# Patient Record
Sex: FEMALE | Race: WHITE | NOT HISPANIC OR LATINO | Employment: OTHER | URBAN - METROPOLITAN AREA
[De-identification: names, ages, dates, MRNs, and addresses within clinical notes are randomized per-mention and may not be internally consistent; named-entity substitution may affect disease eponyms.]

---

## 2022-08-31 ENCOUNTER — OFFICE VISIT (OUTPATIENT)
Dept: HEMATOLOGY/ONCOLOGY | Facility: CLINIC | Age: 87
End: 2022-08-31
Payer: MEDICARE

## 2022-08-31 VITALS
HEIGHT: 62 IN | RESPIRATION RATE: 16 BRPM | BODY MASS INDEX: 21.9 KG/M2 | SYSTOLIC BLOOD PRESSURE: 153 MMHG | WEIGHT: 119 LBS | OXYGEN SATURATION: 97 % | DIASTOLIC BLOOD PRESSURE: 78 MMHG | HEART RATE: 64 BPM

## 2022-08-31 DIAGNOSIS — D61.818 PANCYTOPENIA: ICD-10-CM

## 2022-08-31 DIAGNOSIS — D64.9 ANEMIA, UNSPECIFIED TYPE: Primary | ICD-10-CM

## 2022-08-31 PROCEDURE — 99205 OFFICE O/P NEW HI 60 MIN: CPT | Mod: S$GLB,,, | Performed by: INTERNAL MEDICINE

## 2022-08-31 PROCEDURE — 99205 PR OFFICE/OUTPT VISIT, NEW, LEVL V, 60-74 MIN: ICD-10-PCS | Mod: S$GLB,,, | Performed by: INTERNAL MEDICINE

## 2022-08-31 RX ORDER — ISOSORBIDE MONONITRATE 30 MG/1
TABLET, EXTENDED RELEASE ORAL
COMMUNITY
Start: 2022-07-14

## 2022-08-31 RX ORDER — AMOXICILLIN AND CLAVULANATE POTASSIUM 875; 125 MG/1; MG/1
TABLET, FILM COATED ORAL
COMMUNITY
Start: 2022-06-21

## 2022-08-31 RX ORDER — METOPROLOL SUCCINATE 50 MG/1
TABLET, EXTENDED RELEASE ORAL
COMMUNITY
Start: 2022-07-14

## 2022-08-31 RX ORDER — PRAVASTATIN SODIUM 40 MG/1
TABLET ORAL
COMMUNITY
Start: 2022-07-14

## 2022-08-31 RX ORDER — FLUCONAZOLE 150 MG/1
TABLET ORAL
COMMUNITY
Start: 2022-05-20 | End: 2022-10-12 | Stop reason: DRUGHIGH

## 2022-08-31 RX ORDER — LEVOTHYROXINE SODIUM 75 UG/1
TABLET ORAL
COMMUNITY
Start: 2022-07-14

## 2022-08-31 RX ORDER — ALBUTEROL SULFATE 90 UG/1
AEROSOL, METERED RESPIRATORY (INHALATION)
COMMUNITY
Start: 2022-07-14

## 2022-08-31 RX ORDER — IPRATROPIUM BROMIDE 42 UG/1
SPRAY, METERED NASAL
COMMUNITY
Start: 2022-08-18

## 2022-08-31 NOTE — PROGRESS NOTES
HEMATOLOGY INITIAL CONSULTATION NOTE    Patient ID: Ai Pena is a 89 y.o. female.    Chief Complaint:  Pancytopenia    HPI:  Patient is a 89-year-old female with past medical history of hypertension, CAD, COPD, acute on chronic kidney disease who was referred by Nephrology for evaluation of pancytopenia noted recently on her labs.  The patient reports she has not had any colonoscopy in the past and is not interested in pursuing that at this time.  Due to her complaints of fatigue she was evaluated by Nephrology and underwent lab work which showed normal iron saturation B12 and folate.  She was also offered to be started on Procrit at the recent visit.  Presents to our clinic today for further evaluation                Past Medical History:   Diagnosis Date    Anemia, unspecified     COPD (chronic obstructive pulmonary disease)     Disorder of kidney and ureter     Hypertension     Irregular heart beat     Thyroid disease        Family History   Problem Relation Age of Onset    Heart disease Mother        Social History     Socioeconomic History    Marital status: Unknown   Tobacco Use    Smoking status: Never    Smokeless tobacco: Never   Substance and Sexual Activity    Alcohol use: Never    Drug use: Never         No past surgical history on file.        Review of systems:  Review of Systems   Constitutional:  Positive for activity change and fatigue. Negative for appetite change, chills, diaphoresis and unexpected weight change.   HENT:  Negative for congestion, facial swelling, hearing loss, mouth sores, trouble swallowing and voice change.    Eyes:  Negative for photophobia, pain, discharge and itching.   Respiratory:  Negative for apnea, cough, choking, chest tightness and shortness of breath.    Cardiovascular:  Negative for chest pain, palpitations and leg swelling.   Gastrointestinal:  Negative for abdominal distention, abdominal pain, anal bleeding and blood in stool.   Endocrine: Negative for cold  intolerance, heat intolerance, polydipsia and polyphagia.   Genitourinary:  Negative for difficulty urinating, dysuria, flank pain and hematuria.   Musculoskeletal:  Negative for arthralgias, back pain, joint swelling, myalgias, neck pain and neck stiffness.   Skin:  Negative for color change, pallor and wound.   Allergic/Immunologic: Negative for environmental allergies, food allergies and immunocompromised state.   Neurological:  Negative for dizziness, seizures, facial asymmetry, speech difficulty, light-headedness, numbness and headaches.   Hematological:  Negative for adenopathy. Does not bruise/bleed easily.   Psychiatric/Behavioral:  Negative for agitation, behavioral problems, confusion, decreased concentration and sleep disturbance.                                      Physical Exam  Vitals and nursing note reviewed.   Constitutional:       General: She is not in acute distress.     Appearance: Normal appearance. She is not ill-appearing.   HENT:      Head: Normocephalic and atraumatic.      Nose: No congestion or rhinorrhea.   Eyes:      General: No scleral icterus.     Extraocular Movements: Extraocular movements intact.      Pupils: Pupils are equal, round, and reactive to light.   Cardiovascular:      Rate and Rhythm: Normal rate and regular rhythm.      Pulses: Normal pulses.      Heart sounds: Normal heart sounds. No murmur heard.    No gallop.   Pulmonary:      Effort: Pulmonary effort is normal. No respiratory distress.      Breath sounds: Normal breath sounds. No stridor. No wheezing or rhonchi.   Abdominal:      General: Bowel sounds are normal. There is no distension.      Palpations: There is no mass.      Tenderness: There is no abdominal tenderness. There is no guarding.   Musculoskeletal:         General: No swelling, tenderness, deformity or signs of injury. Normal range of motion.      Cervical back: Normal range of motion and neck supple. No rigidity. No muscular tenderness.      Right  lower leg: No edema.      Left lower leg: No edema.   Skin:     General: Skin is warm.      Coloration: Skin is not jaundiced or pale.      Findings: No bruising or lesion.   Neurological:      General: No focal deficit present.      Mental Status: She is alert and oriented to person, place, and time.      Cranial Nerves: No cranial nerve deficit.      Sensory: No sensory deficit.      Motor: Weakness present.      Gait: Gait normal.      Comments: +ve for Generalized weakness   Psychiatric:         Mood and Affect: Mood normal.         Behavior: Behavior normal.         Thought Content: Thought content normal.     Vitals:    08/31/22 0859   BP: (!) 153/78   Pulse: 64   Resp: 16   Body surface area is 1.54 meters squared.    Assessment/Plan:       Pancytopenia:    == Macrocytic anemia with hemoglobin 7.8, hematocrit 24.7 and .  Neutropenic with ANC less than 1000.  Prior peripheral smear done with her nephrologist revealed anisocytosis, polychromasia with macrocytosis along with target cells.  She was also noted to have elevated lymphocytes along with mild basophilia and banded neutrophils.  == Discussed with her and her family about possible etiologies of pancytopenia in elderly.  Discussed with them about possibility of myelodysplasia syndrome and in this regard would will obtain peripheral smear and flow cytometry for completion.  I do not have B12, folate, TSH levels but have documentation from the nephrologist that these were normal.   == Patient and family at this time have declined colonoscopy and a bone marrow biopsy.  However I discussed with her that if any abnormalities noted on peripheral smear or flow we can discuss this again.  While evaluating her for possible MDS would recommend PRBC blood transfusion for hemoglobin less than 7 or symptomatic.  She reports being on Procrit injections by her nephrologist and this seemed to be helping little bit.  I discussed with her that this is something she  can continue at this time until we have a diagnosis.      Plan:   CBC, peripheral smear, flow cytometry  Iron profile  Type and cross and transfuse 1 unit PRBC for symptomatic anemia      Return to clinic in 2 weeks for MD visit with CBC with diff prior      Pt is instructed to RTC with labs for continued monitoring of treatment as instructed.     Total time spent in counseling and discussion about further management options including relevant lab work, treatment,  prognosis, medications and intended side effects was more than 60 minutes. More than 50 % of the time was spent in counseling and coordination of care.  I spent a total of 60 minutes on the day of the visit.This includes face to face time and non-face to face time preparing to see the patient (eg, review of tests), Obtaining and/or reviewing separately obtained history, Documenting clinical information in the electronic or other health record, Independently interpreting resultsand communicating results to the patient/family/caregiver, or Care coordination.

## 2022-09-01 PROBLEM — D61.818 PANCYTOPENIA: Status: ACTIVE | Noted: 2022-09-01

## 2022-09-09 DIAGNOSIS — D64.9 ANEMIA, UNSPECIFIED TYPE: Primary | ICD-10-CM

## 2022-09-14 ENCOUNTER — OFFICE VISIT (OUTPATIENT)
Dept: HEMATOLOGY/ONCOLOGY | Facility: CLINIC | Age: 87
End: 2022-09-14
Payer: MEDICARE

## 2022-09-14 VITALS
RESPIRATION RATE: 18 BRPM | DIASTOLIC BLOOD PRESSURE: 56 MMHG | SYSTOLIC BLOOD PRESSURE: 167 MMHG | HEIGHT: 62 IN | HEART RATE: 77 BPM | BODY MASS INDEX: 21.77 KG/M2 | OXYGEN SATURATION: 98 %

## 2022-09-14 DIAGNOSIS — D61.818 PANCYTOPENIA: Primary | ICD-10-CM

## 2022-09-14 LAB
ANISOCYTOSIS: ABNORMAL
BANDS: 1 % (ref 0–5)
CELLS COUNTED: 100
EOSINOPHIL NFR BLD: 1 % (ref 1–3)
ERYTHROCYTE [DISTWIDTH] IN BLOOD BY AUTOMATED COUNT: 17.8 % (ref 12.5–18)
HCT VFR BLD AUTO: 28.7 % (ref 37–47)
HGB BLD-MCNC: 9.2 G/DL (ref 12–16)
LYMPHOCYTES NFR BLD: 63 % (ref 25–40)
MCH RBC QN AUTO: 32.1 PG (ref 27–31.2)
MCHC RBC AUTO-ENTMCNC: 32.1 G/DL (ref 31.8–35.4)
MCV RBC AUTO: 100 FL (ref 80–97)
METAMYELOCYTES # BLD MANUAL: 2 % (ref 0–0)
MONOCYTES NFR BLD: 11 % (ref 1–15)
NEUTROPHILS # BLD AUTO: 0.5 10*3/UL (ref 1.8–7.7)
NEUTROPHILS NFR BLD: 22 % (ref 37–80)
NUCLEATED RED BLOOD CELLS: 7.8 %
PLATELETS: 46 10*3/UL (ref 142–424)
RBC # BLD AUTO: 2.87 10*6/UL (ref 4.2–5.4)
SMALL PLATELETS BLD QL SMEAR: ABNORMAL
WBC # BLD: 2.3 10*3/UL (ref 4.6–10.2)

## 2022-09-14 PROCEDURE — 99215 PR OFFICE/OUTPT VISIT, EST, LEVL V, 40-54 MIN: ICD-10-PCS | Mod: S$GLB,,, | Performed by: INTERNAL MEDICINE

## 2022-09-14 PROCEDURE — 99215 OFFICE O/P EST HI 40 MIN: CPT | Mod: S$GLB,,, | Performed by: INTERNAL MEDICINE

## 2022-09-14 NOTE — PROGRESS NOTES
HEMATOLOGY FOLLOW UP CONSULTATION NOTE    Patient ID: Ai Pena is a 89 y.o. female.    Chief Complaint:  Pancytopenia    HPI:  Patient is a 89-year-old female with past medical history of hypertension, CAD, COPD, acute on chronic kidney disease who was referred by Nephrology for evaluation of pancytopenia noted recently on her labs.  The patient reports she has not had any colonoscopy in the past and is not interested in pursuing that at this time.  Due to her complaints of fatigue she was evaluated by Nephrology and underwent lab work which showed normal iron saturation B12 and folate.  She was also offered to be started on Procrit at the recent visit.  Presents to our clinic today for further evaluation      PERIPHERAL BLOOD, FLOW CYTOMETRY: 9/2/22     - 11% CD34+/+ CELLS IDENTIFIED, COMPATIBLE WITH MYELOBLASTS, SEE   COMMENT.   Comment: Flow cytometric analysis identifies an immunophenotypically   distinct population,  compatible with myeloblasts (see complete   immunophenotyping below).  These cells may represent either a reactive   process in response to endogenous or exogenous cytokines, or a neoplastic   process, such as evolution of either a myeloproliferative or myelodysplastic   process.          Past Medical History:   Diagnosis Date    Anemia, unspecified     COPD (chronic obstructive pulmonary disease)     Disorder of kidney and ureter     Hypertension     Irregular heart beat     Thyroid disease        Family History   Problem Relation Age of Onset    Heart disease Mother        Social History     Socioeconomic History    Marital status: Unknown   Tobacco Use    Smoking status: Never    Smokeless tobacco: Never   Substance and Sexual Activity    Alcohol use: Never    Drug use: Never         No past surgical history on file.        Review of systems:  Review of Systems   Constitutional:  Positive for activity change and fatigue. Negative for appetite change, chills, diaphoresis and unexpected  weight change.   HENT:  Negative for congestion, facial swelling, hearing loss, mouth sores, trouble swallowing and voice change.    Eyes:  Negative for photophobia, pain, discharge and itching.   Respiratory:  Negative for apnea, cough, choking, chest tightness and shortness of breath.    Cardiovascular:  Negative for chest pain, palpitations and leg swelling.   Gastrointestinal:  Negative for abdominal distention, abdominal pain, anal bleeding and blood in stool.   Endocrine: Negative for cold intolerance, heat intolerance, polydipsia and polyphagia.   Genitourinary:  Negative for difficulty urinating, dysuria, flank pain and hematuria.   Musculoskeletal:  Negative for arthralgias, back pain, joint swelling, myalgias, neck pain and neck stiffness.   Skin:  Negative for color change, pallor and wound.   Allergic/Immunologic: Negative for environmental allergies, food allergies and immunocompromised state.   Neurological:  Negative for dizziness, seizures, facial asymmetry, speech difficulty, light-headedness, numbness and headaches.   Hematological:  Negative for adenopathy. Does not bruise/bleed easily.   Psychiatric/Behavioral:  Negative for agitation, behavioral problems, confusion, decreased concentration and sleep disturbance.                                      Physical Exam  Vitals and nursing note reviewed.   Constitutional:       General: She is not in acute distress.     Appearance: Normal appearance. She is not ill-appearing.   HENT:      Head: Normocephalic and atraumatic.      Nose: No congestion or rhinorrhea.   Eyes:      General: No scleral icterus.     Extraocular Movements: Extraocular movements intact.      Pupils: Pupils are equal, round, and reactive to light.   Cardiovascular:      Rate and Rhythm: Normal rate and regular rhythm.      Pulses: Normal pulses.      Heart sounds: Normal heart sounds. No murmur heard.    No gallop.   Pulmonary:      Effort: Pulmonary effort is normal. No  respiratory distress.      Breath sounds: Normal breath sounds. No stridor. No wheezing or rhonchi.   Abdominal:      General: Bowel sounds are normal. There is no distension.      Palpations: There is no mass.      Tenderness: There is no abdominal tenderness. There is no guarding.   Musculoskeletal:         General: No swelling, tenderness, deformity or signs of injury. Normal range of motion.      Cervical back: Normal range of motion and neck supple. No rigidity. No muscular tenderness.      Right lower leg: No edema.      Left lower leg: No edema.   Skin:     General: Skin is warm.      Coloration: Skin is not jaundiced or pale.      Findings: No bruising or lesion.   Neurological:      General: No focal deficit present.      Mental Status: She is alert and oriented to person, place, and time.      Cranial Nerves: No cranial nerve deficit.      Sensory: No sensory deficit.      Motor: Weakness present.      Gait: Gait normal.      Comments: +ve for Generalized weakness   Psychiatric:         Mood and Affect: Mood normal.         Behavior: Behavior normal.         Thought Content: Thought content normal.     There were no vitals filed for this visit.  There is no height or weight on file to calculate BSA.    Assessment/Plan:       Pancytopenia:    == Macrocytic anemia with hemoglobin 7.8, hematocrit 24.7 and .  Neutropenic with ANC less than 1000.  Prior peripheral smear done with her nephrologist revealed anisocytosis, polychromasia with macrocytosis along with target cells.  She was also noted to have elevated lymphocytes along with mild basophilia and banded neutrophils.  == Discussed with her and her family about possible etiologies of pancytopenia in elderly.  Discussed with them about possibility of myelodysplasia syndrome and in this regard would will obtain peripheral smear and flow cytometry for completion.  I do not have B12, folate, TSH levels but have documentation from the nephrologist that  these were normal.   == Patient and family at this time have declined colonoscopy and a bone marrow biopsy.  However I discussed with her that if any abnormalities noted on peripheral smear or flow we can discuss this again.  While evaluating her for possible MDS would recommend PRBC blood transfusion for hemoglobin less than 7 or symptomatic.  She reports being on Procrit injections by her nephrologist and this seemed to be helping little bit.  I discussed with her that this is something she can continue at this time until we have a diagnosis.  == 9/14/22:  Peripheral blood flow cytometry revealed 11% CD34+/+ cells compatible with myeloblasts.  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  NextGen sequencing was requested and is pending at this time.  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  Bone marrow biopsy is the next step but patient and family reluctant at this time to do any invasive procedure, understandably secondary to patient's age.  Patient and family reports that 2 months back patient was completely independent and even now has good functional status but gets tired more easily.  Either ways, even if bone marrow biopsy shows more than 20% blast cells and diagnosis of acute leukemia, due to advanced age patient is not a candidate for intensive induction treatment.  If I have more information on NextGen sequencing or if patient agrees to get a bone marrow biopsy and I have an idea about cytogenetics we can discuss other less aggressive treatment options.  In the interim would recommend PRBC transfusion for hemoglobin less than 7      Plan:   Awaiting results of NGS testing  Type and cross and transfuse 1 unit PRBC for symptomatic anemia or hemoglobin less than 7      Return to clinic in 2 weeks for MD visit with CBC with diff prior      Pt is instructed to RTC with labs  for continued monitoring of treatment as instructed.     Total time spent in counseling and discussion about further management options including relevant lab work, treatment,  prognosis, medications and intended side effects was more than 60 minutes. More than 50 % of the time was spent in counseling and coordination of care.  I spent a total of 60 minutes on the day of the visit.This includes face to face time and non-face to face time preparing to see the patient (eg, review of tests), Obtaining and/or reviewing separately obtained history, Documenting clinical information in the electronic or other health record, Independently interpreting resultsand communicating results to the patient/family/caregiver, or Care coordination.

## 2022-09-16 ENCOUNTER — TELEPHONE (OUTPATIENT)
Dept: HEMATOLOGY/ONCOLOGY | Facility: CLINIC | Age: 87
End: 2022-09-16
Payer: MEDICARE

## 2022-09-16 LAB — SPECIMEN TO PATHOLOGY: NORMAL

## 2022-09-20 DIAGNOSIS — D61.818 PANCYTOPENIA: Primary | ICD-10-CM

## 2022-09-21 ENCOUNTER — OFFICE VISIT (OUTPATIENT)
Dept: HEMATOLOGY/ONCOLOGY | Facility: CLINIC | Age: 87
End: 2022-09-21
Payer: MEDICARE

## 2022-09-21 VITALS
OXYGEN SATURATION: 99 % | DIASTOLIC BLOOD PRESSURE: 59 MMHG | BODY MASS INDEX: 22.26 KG/M2 | WEIGHT: 121 LBS | HEART RATE: 83 BPM | RESPIRATION RATE: 15 BRPM | SYSTOLIC BLOOD PRESSURE: 164 MMHG | HEIGHT: 62 IN

## 2022-09-21 DIAGNOSIS — D46.9 MDS (MYELODYSPLASTIC SYNDROME): ICD-10-CM

## 2022-09-21 DIAGNOSIS — D61.818 PANCYTOPENIA: Primary | ICD-10-CM

## 2022-09-21 LAB
ANISOCYTOSIS: ABNORMAL
ATYPICAL LYMPHOCYTES: 1 % (ref 0–0)
BANDS: 1 % (ref 0–5)
CELLS COUNTED: 100
EOSINOPHIL NFR BLD: 4 % (ref 1–3)
ERYTHROCYTE [DISTWIDTH] IN BLOOD BY AUTOMATED COUNT: 18.2 % (ref 12.5–18)
HCT VFR BLD AUTO: 26.1 % (ref 37–47)
HGB BLD-MCNC: 8.2 G/DL (ref 12–16)
LYMPHOCYTES NFR BLD: 64 % (ref 25–40)
MACROCYTES BLD QL SMEAR: ABNORMAL
MCH RBC QN AUTO: 31.7 PG (ref 27–31.2)
MCHC RBC AUTO-ENTMCNC: 31.4 G/DL (ref 31.8–35.4)
MCV RBC AUTO: 100.8 FL (ref 80–97)
METAMYELOCYTES # BLD MANUAL: 1 % (ref 0–0)
MONOCYTES NFR BLD: 11 % (ref 1–15)
NEUTROPHILS # BLD AUTO: 0.5 10*3/UL (ref 1.8–7.7)
NEUTROPHILS NFR BLD: 18 % (ref 37–80)
PLATELETS: 62 10*3/UL (ref 142–424)
RBC # BLD AUTO: 2.59 10*6/UL (ref 4.2–5.4)
SMALL PLATELETS BLD QL SMEAR: ABNORMAL
WBC # BLD: 2.5 10*3/UL (ref 4.6–10.2)

## 2022-09-21 PROCEDURE — 99215 OFFICE O/P EST HI 40 MIN: CPT | Mod: S$GLB,,, | Performed by: INTERNAL MEDICINE

## 2022-09-21 PROCEDURE — 99215 PR OFFICE/OUTPT VISIT, EST, LEVL V, 40-54 MIN: ICD-10-PCS | Mod: S$GLB,,, | Performed by: INTERNAL MEDICINE

## 2022-09-21 NOTE — PROGRESS NOTES
HEMATOLOGY FOLLOW UP CONSULTATION NOTE    Patient ID: Ai Pena is a 89 y.o. female.    Chief Complaint:  Pancytopenia    HPI:  Patient is a 89-year-old female with past medical history of hypertension, CAD, COPD, acute on chronic kidney disease who was referred by Nephrology for evaluation of pancytopenia noted recently on her labs.  The patient reports she has not had any colonoscopy in the past and is not interested in pursuing that at this time.  Due to her complaints of fatigue she was evaluated by Nephrology and underwent lab work which showed normal iron saturation B12 and folate.  She was also offered to be started on Procrit at the recent visit.  Presents to our clinic today for further evaluation      PERIPHERAL BLOOD, FLOW CYTOMETRY: 9/2/22     - 11% CD34+/+ CELLS IDENTIFIED, COMPATIBLE WITH MYELOBLASTS, SEE   COMMENT.   Comment: Flow cytometric analysis identifies an immunophenotypically   distinct population,  compatible with myeloblasts (see complete   immunophenotyping below).  These cells may represent either a reactive   process in response to endogenous or exogenous cytokines, or a neoplastic   process, such as evolution of either a myeloproliferative or myelodysplastic   process.          Past Medical History:   Diagnosis Date    Anemia, unspecified     COPD (chronic obstructive pulmonary disease)     Disorder of kidney and ureter     Hypertension     Irregular heart beat     Thyroid disease        Family History   Problem Relation Age of Onset    Heart disease Mother        Social History     Socioeconomic History    Marital status: Unknown   Tobacco Use    Smoking status: Never    Smokeless tobacco: Never   Substance and Sexual Activity    Alcohol use: Never    Drug use: Never         No past surgical history on file.        Review of systems:  Review of Systems   Constitutional:  Positive for activity change and fatigue. Negative for appetite change, chills, diaphoresis and unexpected  weight change.   HENT:  Negative for congestion, facial swelling, hearing loss, mouth sores, trouble swallowing and voice change.    Eyes:  Negative for photophobia, pain, discharge and itching.   Respiratory:  Negative for apnea, cough, choking, chest tightness and shortness of breath.    Cardiovascular:  Negative for chest pain, palpitations and leg swelling.   Gastrointestinal:  Negative for abdominal distention, abdominal pain, anal bleeding and blood in stool.   Endocrine: Negative for cold intolerance, heat intolerance, polydipsia and polyphagia.   Genitourinary:  Negative for difficulty urinating, dysuria, flank pain and hematuria.   Musculoskeletal:  Negative for arthralgias, back pain, joint swelling, myalgias, neck pain and neck stiffness.   Skin:  Negative for color change, pallor and wound.   Allergic/Immunologic: Negative for environmental allergies, food allergies and immunocompromised state.   Neurological:  Negative for dizziness, seizures, facial asymmetry, speech difficulty, light-headedness, numbness and headaches.   Hematological:  Negative for adenopathy. Does not bruise/bleed easily.   Psychiatric/Behavioral:  Negative for agitation, behavioral problems, confusion, decreased concentration and sleep disturbance.                                      Physical Exam  Vitals and nursing note reviewed.   Constitutional:       General: She is not in acute distress.     Appearance: Normal appearance. She is not ill-appearing.   HENT:      Head: Normocephalic and atraumatic.      Nose: No congestion or rhinorrhea.   Eyes:      General: No scleral icterus.     Extraocular Movements: Extraocular movements intact.      Pupils: Pupils are equal, round, and reactive to light.   Cardiovascular:      Rate and Rhythm: Normal rate and regular rhythm.      Pulses: Normal pulses.      Heart sounds: Normal heart sounds. No murmur heard.    No gallop.   Pulmonary:      Effort: Pulmonary effort is normal. No  respiratory distress.      Breath sounds: Normal breath sounds. No stridor. No wheezing or rhonchi.   Abdominal:      General: Bowel sounds are normal. There is no distension.      Palpations: There is no mass.      Tenderness: There is no abdominal tenderness. There is no guarding.   Musculoskeletal:         General: No swelling, tenderness, deformity or signs of injury. Normal range of motion.      Cervical back: Normal range of motion and neck supple. No rigidity. No muscular tenderness.      Right lower leg: No edema.      Left lower leg: No edema.   Skin:     General: Skin is warm.      Coloration: Skin is not jaundiced or pale.      Findings: No bruising or lesion.   Neurological:      General: No focal deficit present.      Mental Status: She is alert and oriented to person, place, and time.      Cranial Nerves: No cranial nerve deficit.      Sensory: No sensory deficit.      Motor: Weakness present.      Gait: Gait normal.      Comments: +ve for Generalized weakness   Psychiatric:         Mood and Affect: Mood normal.         Behavior: Behavior normal.         Thought Content: Thought content normal.     Vitals:    09/21/22 1124   BP: (!) 164/59   Pulse: 83   Resp: 15     Body surface area is 1.55 meters squared.    Assessment/Plan:       MDS: TP53, SRSF2, ATRX +ve:    == Macrocytic anemia with hemoglobin 7.8, hematocrit 24.7 and .  Neutropenic with ANC less than 1000.  Prior peripheral smear done with her nephrologist revealed anisocytosis, polychromasia with macrocytosis along with target cells.  She was also noted to have elevated lymphocytes along with mild basophilia and banded neutrophils.  == Discussed with her and her family about possible etiologies of pancytopenia in elderly.  Discussed with them about possibility of myelodysplasia syndrome and in this regard would will obtain peripheral smear and flow cytometry for completion.  I do not have B12, folate, TSH levels but have documentation  from the nephrologist that these were normal.   == Patient and family at this time have declined colonoscopy and a bone marrow biopsy.  However I discussed with her that if any abnormalities noted on peripheral smear or flow we can discuss this again.  While evaluating her for possible MDS would recommend PRBC blood transfusion for hemoglobin less than 7 or symptomatic.  She reports being on Procrit injections by her nephrologist and this seemed to be helping little bit.  I discussed with her that this is something she can continue at this time until we have a diagnosis.  == 9/14/22:  Peripheral blood flow cytometry revealed 11% CD34+/+ cells compatible with myeloblasts.  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  NextGen sequencing was requested and is pending at this time.  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  Bone marrow biopsy is the next step but patient and family reluctant at this time to do any invasive procedure, understandably secondary to patient's age.  Patient and family reports that 2 months back patient was completely independent and even now has good functional status but gets tired more easily.  Either ways, even if bone marrow biopsy shows more than 20% blast cells and diagnosis of acute leukemia, due to advanced age patient is not a candidate for intensive induction treatment.  If I have more information on NextGen sequencing or if patient agrees to get a bone marrow biopsy and I have an idea about cytogenetics we can discuss other less aggressive treatment options.  In the interim would recommend PRBC transfusion for hemoglobin less than 7  == 9/2/22:  Results from extended NGS on flow cytometry revealed no alterations in FLT3, IDH1, IDH2 and NPM1. Relevant positive mutations noted were  as below:  TP53 which is associated with a poor prognosis in  myelodysplasia syndrome and are more frequently seen with complex karyotype and deletion (5q).  TP53 mutations may predict resistance or relapse to lenalidomide.  SRSF2 mutations are common and MDS, CMML, AML and MPN.  Among cases of AML, SRSF2 mutations are much more common in secondary AML.  They are associated with an unfavorable prognosis in MDS and MPN.  The ATRX mutation is a low-level mutation that appears to be present in the sub population of cells.  ATRX gene mutation and myeloid neoplasms is uncommon        Plan:   Referral for bone marrow biopsy  Type and cross and transfuse 1 unit PRBC for symptomatic anemia or hemoglobin less than 7      Return to clinic in 2 weeks for MD visit with CBC with diff prior      Pt is instructed to RTC with labs for continued monitoring of treatment as instructed.     Total time spent in counseling and discussion about further management options including relevant lab work, treatment,  prognosis, medications and intended side effects was more than 60 minutes. More than 50 % of the time was spent in counseling and coordination of care.  I spent a total of 60 minutes on the day of the visit.This includes face to face time and non-face to face time preparing to see the patient (eg, review of tests), Obtaining and/or reviewing separately obtained history, Documenting clinical information in the electronic or other health record, Independently interpreting resultsand communicating results to the patient/family/caregiver, or Care coordination.

## 2022-09-26 DIAGNOSIS — D61.818 PANCYTOPENIA: Primary | ICD-10-CM

## 2022-09-26 LAB
ANISOCYTOSIS: ABNORMAL
CELLS COUNTED: 100
EOSINOPHIL NFR BLD: 1 % (ref 0–6)
ERYTHROCYTE [DISTWIDTH] IN BLOOD BY AUTOMATED COUNT: 17.9 % (ref 0–15.5)
FRAGMENTED CELLS: ABNORMAL
GRANULOCYTES: 0.2 10*3/UL (ref 1.4–7)
HCT VFR BLD AUTO: 22.6 % (ref 37–47)
HGB BLD-MCNC: 7 G/DL (ref 12–16)
HYPOCHROMIA BLD QL SMEAR: ABNORMAL
LYMPHOCYTES NFR BLD: 80 % (ref 20–45)
MACROCYTES BLD QL SMEAR: ABNORMAL
MCH RBC QN AUTO: 31 PG (ref 27–32)
MCHC RBC AUTO-ENTMCNC: 31 % (ref 32–36)
MCV RBC AUTO: 100 FL (ref 80–99)
METAMYELOCYTES # BLD MANUAL: 1 % (ref 0–1)
MONOCYTES NFR BLD: 4 % (ref 2–10)
MYELOCYTES: 4 % (ref 0–1)
NEUTROPHILS NFR BLD: 10 % (ref 50–80)
NUCLEATED RBC-MANUAL: 0 /100 WBC
PLATELET MORPHOLOGY: ABNORMAL
PLATELETS: 45 10*3/UL (ref 130–400)
POIKILOCYTOSIS: ABNORMAL
RBC # BLD AUTO: 2.26 10*6/UL (ref 4.2–5.4)
SMALL PLATELETS BLD QL SMEAR: ABNORMAL
WBC # BLD: 2.3 10*3/UL (ref 4.5–10)

## 2022-09-30 DIAGNOSIS — D61.818 PANCYTOPENIA: Primary | ICD-10-CM

## 2022-10-03 ENCOUNTER — OFFICE VISIT (OUTPATIENT)
Dept: HEMATOLOGY/ONCOLOGY | Facility: CLINIC | Age: 87
End: 2022-10-03
Payer: MEDICARE

## 2022-10-03 VITALS
RESPIRATION RATE: 16 BRPM | HEART RATE: 71 BPM | DIASTOLIC BLOOD PRESSURE: 72 MMHG | WEIGHT: 128 LBS | SYSTOLIC BLOOD PRESSURE: 151 MMHG | OXYGEN SATURATION: 96 % | BODY MASS INDEX: 23.55 KG/M2 | HEIGHT: 62 IN

## 2022-10-03 DIAGNOSIS — C92.00 ACUTE MYELOID LEUKEMIA NOT HAVING ACHIEVED REMISSION: Primary | ICD-10-CM

## 2022-10-03 LAB
ATYPICAL LYMPHOCYTES: 1 % (ref 0–0)
BANDS: 1 % (ref 0–5)
CELLS COUNTED: 100
ERYTHROCYTE [DISTWIDTH] IN BLOOD BY AUTOMATED COUNT: 17.4 % (ref 12.5–18)
HCT VFR BLD AUTO: 25.3 % (ref 37–47)
HGB BLD-MCNC: 8.3 G/DL (ref 12–16)
LYMPHOCYTES NFR BLD: 56 % (ref 25–40)
MCH RBC QN AUTO: 31.3 PG (ref 27–31.2)
MCHC RBC AUTO-ENTMCNC: 32.4 G/DL (ref 31.8–35.4)
MCV RBC AUTO: 96.6 FL (ref 80–97)
METAMYELOCYTES # BLD MANUAL: 5 % (ref 0–0)
MONOCYTES NFR BLD: 15 % (ref 1–15)
MYELOCYTES: 3 % (ref 0–0)
NEUTROPHILS # BLD AUTO: 0.4 10*3/UL (ref 1.8–7.7)
NEUTROPHILS NFR BLD: 19 % (ref 37–80)
NUCLEATED RED BLOOD CELLS: 4.1 %
PLATELETS: 65 10*3/UL (ref 142–424)
RBC # BLD AUTO: 2.62 10*6/UL (ref 4.2–5.4)
RBC MORPH BLD: ABNORMAL
SMALL PLATELETS BLD QL SMEAR: ABNORMAL
WBC # BLD: 2.2 10*3/UL (ref 4.6–10.2)

## 2022-10-03 PROCEDURE — 99215 OFFICE O/P EST HI 40 MIN: CPT | Mod: S$GLB,,, | Performed by: INTERNAL MEDICINE

## 2022-10-03 PROCEDURE — 99215 PR OFFICE/OUTPT VISIT, EST, LEVL V, 40-54 MIN: ICD-10-PCS | Mod: S$GLB,,, | Performed by: INTERNAL MEDICINE

## 2022-10-04 ENCOUNTER — SPECIALTY PHARMACY (OUTPATIENT)
Dept: PHARMACY | Facility: CLINIC | Age: 87
End: 2022-10-04
Payer: MEDICARE

## 2022-10-04 DIAGNOSIS — D61.818 PANCYTOPENIA: Primary | ICD-10-CM

## 2022-10-04 DIAGNOSIS — C92.00 ACUTE MYELOID LEUKEMIA NOT HAVING ACHIEVED REMISSION: Primary | ICD-10-CM

## 2022-10-04 RX ORDER — ONDANSETRON 8 MG/1
8 TABLET, ORALLY DISINTEGRATING ORAL 3 TIMES DAILY PRN
Qty: 90 TABLET | Refills: 6 | Status: SHIPPED | OUTPATIENT
Start: 2022-10-04 | End: 2022-11-03

## 2022-10-04 NOTE — PROGRESS NOTES
HEMATOLOGY FOLLOW UP CONSULTATION NOTE    Patient ID: Ai Pena is a 89 y.o. female.    Chief Complaint:  Pancytopenia    HPI:  Patient is a 89-year-old female with past medical history of hypertension, CAD, COPD, acute on chronic kidney disease who was referred by Nephrology for evaluation of pancytopenia noted recently on her labs.  The patient reports she has not had any colonoscopy in the past and is not interested in pursuing that at this time.  Due to her complaints of fatigue she was evaluated by Nephrology and underwent lab work which showed normal iron saturation B12 and folate.  She was also offered to be started on Procrit at the recent visit.  Presents to our clinic today for further evaluation      PERIPHERAL BLOOD, FLOW CYTOMETRY: 9/2/22     - 11% CD34+/+ CELLS IDENTIFIED, COMPATIBLE WITH MYELOBLASTS, SEE   COMMENT.   Comment: Flow cytometric analysis identifies an immunophenotypically   distinct population,  compatible with myeloblasts (see complete   immunophenotyping below).  These cells may represent either a reactive   process in response to endogenous or exogenous cytokines, or a neoplastic   process, such as evolution of either a myeloproliferative or myelodysplastic   process.    09/30/2022    PERIPHERAL SMEAR REVIEW, BONE MARROW ASPIRATION, BONE MARROW BIOPSY AND FLOW   CYTOMETRIC EVALUATION:      1.  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      2.  RESULTING PANCYTOPENIA.      3.  MACROCYTIC ANEMIA.      4.  PERIPHERAL ABSOLUTE NEUTROPENIA.      5.  THROMBOCYTOPENIA.      6.  SOME SCHISTOCYTES ARE PRESENT.      7.  IRON 4+ OF 6.      8.  MOLECULAR STUDIES ARE UNDERWAY AND WILL BE REPORTED AT A LATER TIME.      9.  SEE BELOW.   Comment:  The patient presents with a history of pancytopenia and the   patient has had a previous flow cytometry showing 11% myeloblasts   approximately one month ago, see [id number].   Currently the patient has 22%   myeloblasts in the bone marrow,  qualifying this as acute leukemia.   This   information is relayed to Dr. Olivas on 09/29/2022 who requested some   molecular studies be performed on this specimen.  That testing is underway   and will be reported at a later time.  Therefore this bone marrow has   progressed to acute myeloid leukemia with 22% myeloblasts present on flow   cytometry.  This could be occurring in a background of myelodysplasia   because of the significant atypia seen in the megakaryocytes.     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 Per professional practice guidelines, testing for IDH   mutations has diagnostic and prognostic implications in the workup of   gliomas and guides therapy selection in AML. AML patients with IDH mutations    may respond to venetoclax-based therapy or IDH inhibitors.                               Past Medical History:   Diagnosis Date    Anemia, unspecified     COPD (chronic obstructive pulmonary disease)     Disorder of kidney and ureter     Hypertension     Irregular heart beat     Thyroid disease        Family History   Problem Relation Age of Onset    Heart disease Mother        Social History     Socioeconomic History    Marital status: Unknown   Tobacco Use    Smoking status: Never    Smokeless tobacco: Never   Substance and Sexual Activity    Alcohol use: Never    Drug use: Never         No past surgical history on file.        Review of systems:  Review of Systems   Constitutional:  Positive for activity change and fatigue.  Negative for appetite change, chills, diaphoresis and unexpected weight change.   HENT:  Negative for congestion, facial swelling, hearing loss, mouth sores, trouble swallowing and voice change.    Eyes:  Negative for photophobia, pain, discharge and itching.   Respiratory:  Negative for apnea, cough, choking, chest tightness and shortness of breath.    Cardiovascular:  Negative for chest pain, palpitations and leg swelling.   Gastrointestinal:  Negative for abdominal distention, abdominal pain, anal bleeding and blood in stool.   Endocrine: Negative for cold intolerance, heat intolerance, polydipsia and polyphagia.   Genitourinary:  Negative for difficulty urinating, dysuria, flank pain and hematuria.   Musculoskeletal:  Negative for arthralgias, back pain, joint swelling, myalgias, neck pain and neck stiffness.   Skin:  Negative for color change, pallor and wound.   Allergic/Immunologic: Negative for environmental allergies, food allergies and immunocompromised state.   Neurological:  Negative for dizziness, seizures, facial asymmetry, speech difficulty, light-headedness, numbness and headaches.   Hematological:  Negative for adenopathy. Does not bruise/bleed easily.   Psychiatric/Behavioral:  Negative for agitation, behavioral problems, confusion, decreased concentration and sleep disturbance.                                      Physical Exam  Vitals and nursing note reviewed.   Constitutional:       General: She is not in acute distress.     Appearance: Normal appearance. She is not ill-appearing.   HENT:      Head: Normocephalic and atraumatic.      Nose: No congestion or rhinorrhea.   Eyes:      General: No scleral icterus.     Extraocular Movements: Extraocular movements intact.      Pupils: Pupils are equal, round, and reactive to light.   Cardiovascular:      Rate and Rhythm: Normal rate and regular rhythm.      Pulses: Normal pulses.      Heart sounds: Normal heart sounds. No murmur heard.    No gallop.    Pulmonary:      Effort: Pulmonary effort is normal. No respiratory distress.      Breath sounds: Normal breath sounds. No stridor. No wheezing or rhonchi.   Abdominal:      General: Bowel sounds are normal. There is no distension.      Palpations: There is no mass.      Tenderness: There is no abdominal tenderness. There is no guarding.   Musculoskeletal:         General: No swelling, tenderness, deformity or signs of injury. Normal range of motion.      Cervical back: Normal range of motion and neck supple. No rigidity. No muscular tenderness.      Right lower leg: No edema.      Left lower leg: No edema.   Skin:     General: Skin is warm.      Coloration: Skin is not jaundiced or pale.      Findings: No bruising or lesion.   Neurological:      General: No focal deficit present.      Mental Status: She is alert and oriented to person, place, and time.      Cranial Nerves: No cranial nerve deficit.      Sensory: No sensory deficit.      Motor: Weakness present.      Gait: Gait normal.      Comments: +ve for Generalized weakness   Psychiatric:         Mood and Affect: Mood normal.         Behavior: Behavior normal.         Thought Content: Thought content normal.     Vitals:    10/03/22 1303   BP: (!) 151/72   Pulse: 71   Resp: 16     Body surface area is 1.59 meters squared.    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 Results from extended NGS on flow cytometry revealed no alterations in FLT3, IDH1, IDH2 and NPM1. Relevant positive mutations noted were: TP53, SRSF2, ATRX.  == 10/4/22: Bone marrow biopsy revealed 22% myeloblasts-- further studies pending at this time and were requested. Based on Flow results and advanced age, she has Acute Leukemia and patient and family are not interested to travel outside [place]. She reports being fully  function prior to this recent pancytopenia and still is quite independent as far as her daily activities are concerned. She is not a candidate for induction chemotherapy and she fully understands this and prognosis. I discussed with her about adverse risk Acute Myeloid leukemia and prognosis alongwith treatment options. Discussed with her about Azacitadine only and she is agreeable to oral medications at this time. I also offered her referral to tertiary academic cancer centre with experience in treating Acute Leukemias, she is not agreeable to that at this time. She is agreeable to be evaluated virtually and in this regard I will make referral to Dr Bah at [facility]. She voices understanding.  Addendum:  Received IDH mutation analysis reports and she is noted to have IDH 2 mutation. Please see report above. The NGS panel on peripheral blood previously did not show this        Plan:   Telemedicine visit with Leukemia specialist in [place]  If agreeable treatment options would include AZA plus Venetoclax vs Enasidenib vs AZA only      Return to clinic in 2 weeks for MD visit with CBC with diff prior      Pt is instructed to RTC with labs for continued monitoring of treatment as instructed.     Total time spent in counseling and discussion about further management options including relevant lab work, treatment,  prognosis, medications and intended side effects was more than 60 minutes. More than 50 % of the time was spent in counseling and coordination of care.  I spent a total of 60 minutes on the day of the visit.This includes face to face time and non-face to face time preparing to see the patient (eg, review of tests), Obtaining and/or reviewing separately obtained history, Documenting clinical information in the electronic or other health record, Independently interpreting resultsand communicating results to the patient/family/caregiver, or Care coordination.

## 2022-10-05 ENCOUNTER — PATIENT MESSAGE (OUTPATIENT)
Dept: PHARMACY | Facility: CLINIC | Age: 87
End: 2022-10-05
Payer: MEDICARE

## 2022-10-05 DIAGNOSIS — C92.00 ACUTE MYELOID LEUKEMIA NOT HAVING ACHIEVED REMISSION: Primary | ICD-10-CM

## 2022-10-05 NOTE — TELEPHONE ENCOUNTER
Therapy appropriate. PA submitted via CM.   Key: [id number] - PA Case ID: [id number]  Awaiting determination.

## 2022-10-06 DIAGNOSIS — C92.00 ACUTE MYELOID LEUKEMIA NOT HAVING ACHIEVED REMISSION: Primary | ICD-10-CM

## 2022-10-06 LAB
ALBUMIN SERPL BCP-MCNC: 3.7 G/DL (ref 3.4–5)
ALBUMIN/GLOBULIN RATIO: 1.03 RATIO (ref 1.1–1.8)
ALP SERPL-CCNC: 46 U/L (ref 46–116)
ALT SERPL W P-5'-P-CCNC: 9 U/L (ref 12–78)
ANION GAP SERPL CALC-SCNC: 6 MMOL/L (ref 3–11)
ANISOCYTOSIS: ABNORMAL
AST SERPL-CCNC: 17 U/L (ref 15–37)
ATYPICAL LYMPHOCYTES: 2 % (ref 0–0)
BANDS: 1 % (ref 0–5)
BILIRUB SERPL-MCNC: 1.2 MG/DL (ref 0–1)
BUN SERPL-MCNC: 27 MG/DL (ref 7–18)
BUN/CREAT SERPL: 13.04 RATIO (ref 7–18)
CALCIUM SERPL-MCNC: 8.9 MG/DL (ref 8.8–10.5)
CELLS COUNTED: 100
CHLORIDE SERPL-SCNC: 102 MMOL/L (ref 100–108)
CO2 SERPL-SCNC: 29 MMOL/L (ref 21–32)
CREAT SERPL-MCNC: 2.07 MG/DL (ref 0.55–1.02)
ERYTHROCYTE [DISTWIDTH] IN BLOOD BY AUTOMATED COUNT: 17.7 % (ref 12.5–18)
GFR ESTIMATION: 22
GLOBULIN: 3.6 G/DL (ref 2.3–3.5)
GLUCOSE SERPL-MCNC: 129 MG/DL (ref 70–110)
HCT VFR BLD AUTO: 25.5 % (ref 37–47)
HGB BLD-MCNC: 8.1 G/DL (ref 12–16)
HYPOCHROMIA BLD QL SMEAR: ABNORMAL
LYMPHOCYTES NFR BLD: 75 % (ref 25–40)
MCH RBC QN AUTO: 31 PG (ref 27–31.2)
MCHC RBC AUTO-ENTMCNC: 31.8 G/DL (ref 31.8–35.4)
MCV RBC AUTO: 97.7 FL (ref 80–97)
METAMYELOCYTES # BLD MANUAL: 3 % (ref 0–0)
MONOCYTES NFR BLD: 9 % (ref 1–15)
NEUTROPHILS # BLD AUTO: 0.2 10*3/UL (ref 1.8–7.7)
NEUTROPHILS NFR BLD: 10 % (ref 37–80)
NUCLEATED RED BLOOD CELLS: 3.7 %
PLATELETS: 43 10*3/UL (ref 142–424)
POTASSIUM SERPL-SCNC: 4.4 MMOL/L (ref 3.6–5.2)
PROT SERPL-MCNC: 7.3 G/DL (ref 6.4–8.2)
RBC # BLD AUTO: 2.61 10*6/UL (ref 4.2–5.4)
SMALL PLATELETS BLD QL SMEAR: ABNORMAL
SODIUM BLD-SCNC: 137 MMOL/L (ref 135–145)
WBC # BLD: 2.2 10*3/UL (ref 4.6–10.2)

## 2022-10-06 NOTE — TELEPHONE ENCOUNTER
"PA denied.     ONUREG TAB 300MG is not FDA approved or supported by an accepted reference for your  medical condition(s): acute myeloid leukemia not able to complete intensive curative therapy. In  order to approve your medication, you must also have achieved first complete remission (CR) or  complete remission with incomplete blood count recovery (CRi) following intensive induction  chemotherapy. Therefore your drug is denied because it is not being used for a "medically accepted  indication."      Will notify [facility] and work on an appeal.   "

## 2022-10-10 ENCOUNTER — OFFICE VISIT (OUTPATIENT)
Dept: HEMATOLOGY/ONCOLOGY | Facility: CLINIC | Age: 87
End: 2022-10-10
Payer: MEDICARE

## 2022-10-10 DIAGNOSIS — D61.818 PANCYTOPENIA: ICD-10-CM

## 2022-10-10 DIAGNOSIS — D84.9 IMMUNOSUPPRESSION: ICD-10-CM

## 2022-10-10 DIAGNOSIS — C92.00 ACUTE MYELOID LEUKEMIA NOT HAVING ACHIEVED REMISSION: Primary | ICD-10-CM

## 2022-10-10 PROCEDURE — 99215 OFFICE O/P EST HI 40 MIN: CPT | Mod: 95,,, | Performed by: INTERNAL MEDICINE

## 2022-10-10 PROCEDURE — 99215 PR OFFICE/OUTPT VISIT, EST, LEVL V, 40-54 MIN: ICD-10-PCS | Mod: 95,,, | Performed by: INTERNAL MEDICINE

## 2022-10-10 NOTE — PATIENT INSTRUCTIONS
AML TREATMENT OPTIONS:    These are the four treatment options which we talked about. They are listed in order from most efficacious and toxic to least efficacious/toxic. I am happy to chat more about these options. All four are very reasonable options - it just depends on your goals of care.    Azacitidine (subcutaneous shot) daily x5 days plus venetoclax (pill) daily x14 days every 28 days  Enasidenib (pill) (if approved) daily  Azacitidine (subcutaneous shot) daily x5 days every 28 days  Supportive care (blood transfusions and Hydrea as needed)

## 2022-10-11 DIAGNOSIS — C92.00 ACUTE MYELOID LEUKEMIA NOT HAVING ACHIEVED REMISSION: Primary | ICD-10-CM

## 2022-10-12 ENCOUNTER — PATIENT MESSAGE (OUTPATIENT)
Dept: HEMATOLOGY/ONCOLOGY | Facility: CLINIC | Age: 87
End: 2022-10-12
Payer: MEDICARE

## 2022-10-12 ENCOUNTER — SPECIALTY PHARMACY (OUTPATIENT)
Dept: PHARMACY | Facility: CLINIC | Age: 87
End: 2022-10-12
Payer: MEDICARE

## 2022-10-12 NOTE — TELEPHONE ENCOUNTER
Therapy appropriate for IDH2 mutated AML.     Messaged [facility] to request a prescription for enasidenib 100 mg daily instead of enasidenib 50 mg BID. Insurance will pay for 1 tablet per day. In the meantime, I will submit a PA for enasidenib 100 mg tablets.     PA submitted via [facility].  Key: [id number] - PA Case ID: [id number]. Awaiting determination.

## 2022-10-12 NOTE — TELEPHONE ENCOUNTER
Returned pts daughter's incoming call about form received about Onureg. Informed Ms. Vazquez to have form signed and send to [facility]. Stated she would. Also stated that Dr. Olivas also sent over an order for Idhifa. Informed her that authorization for Idhifa would be worked on and once approved would reach back out to the patient. Voiced understanding.

## 2022-10-12 NOTE — TELEPHONE ENCOUNTER
Idhifa 100 mg received. Routing to BI/FA to assist with covering copay.     Provider referral form faxed.

## 2022-10-12 NOTE — TELEPHONE ENCOUNTER
IDHIFA 100 mg approved. Request Reference Number: [id number]. IDHIFA TAB 100MG is approved through 12/31/2023. Copay 3529.72.     Current prescription is written for IDHIFA 50 mg BID.   Once we receive a new prescription for IDHIFA 100 mg, I will route to BI/FA.

## 2022-10-14 ENCOUNTER — OFFICE VISIT (OUTPATIENT)
Dept: HEMATOLOGY/ONCOLOGY | Facility: CLINIC | Age: 87
End: 2022-10-14
Payer: MEDICARE

## 2022-10-14 ENCOUNTER — TELEPHONE (OUTPATIENT)
Dept: HEMATOLOGY/ONCOLOGY | Facility: CLINIC | Age: 87
End: 2022-10-14

## 2022-10-14 VITALS
SYSTOLIC BLOOD PRESSURE: 121 MMHG | OXYGEN SATURATION: 97 % | RESPIRATION RATE: 14 BRPM | TEMPERATURE: 99 F | DIASTOLIC BLOOD PRESSURE: 71 MMHG | HEART RATE: 79 BPM

## 2022-10-14 DIAGNOSIS — C92.00 ACUTE MYELOID LEUKEMIA NOT HAVING ACHIEVED REMISSION: Primary | ICD-10-CM

## 2022-10-14 PROBLEM — D46.9 MDS (MYELODYSPLASTIC SYNDROME): Status: RESOLVED | Noted: 2022-09-21 | Resolved: 2022-10-14

## 2022-10-14 LAB
ALBUMIN SERPL BCP-MCNC: 3.6 G/DL (ref 3.4–5)
ALBUMIN/GLOBULIN RATIO: 0.82 RATIO (ref 1.1–1.8)
ALP SERPL-CCNC: 56 U/L (ref 46–116)
ALT SERPL W P-5'-P-CCNC: 15 U/L (ref 12–78)
ANION GAP SERPL CALC-SCNC: 6 MMOL/L (ref 3–11)
ANISOCYTOSIS: ABNORMAL
AST SERPL-CCNC: 20 U/L (ref 15–37)
BANDS: 2 % (ref 0–5)
BILIRUB SERPL-MCNC: 1.3 MG/DL (ref 0–1)
BUN SERPL-MCNC: 36 MG/DL (ref 7–18)
BUN/CREAT SERPL: 17.56 RATIO (ref 7–18)
CALCIUM SERPL-MCNC: 9.1 MG/DL (ref 8.8–10.5)
CELLS COUNTED: 100
CHLORIDE SERPL-SCNC: 98 MMOL/L (ref 100–108)
CO2 SERPL-SCNC: 27 MMOL/L (ref 21–32)
CREAT SERPL-MCNC: 2.05 MG/DL (ref 0.55–1.02)
ERYTHROCYTE [DISTWIDTH] IN BLOOD BY AUTOMATED COUNT: 18 % (ref 12.5–18)
GFR ESTIMATION: 23
GLOBULIN: 4.4 G/DL (ref 2.3–3.5)
GLUCOSE SERPL-MCNC: 171 MG/DL (ref 70–110)
HCT VFR BLD AUTO: 24.4 % (ref 37–47)
HGB BLD-MCNC: 8 G/DL (ref 12–16)
HYPOCHROMIA BLD QL SMEAR: ABNORMAL
LYMPHOCYTES NFR BLD: 53 % (ref 25–40)
MCH RBC QN AUTO: 31.6 PG (ref 27–31.2)
MCHC RBC AUTO-ENTMCNC: 32.8 G/DL (ref 31.8–35.4)
MCV RBC AUTO: 96.4 FL (ref 80–97)
METAMYELOCYTES # BLD MANUAL: 8 % (ref 0–0)
MONOCYTES NFR BLD: 16 % (ref 1–15)
MYELOCYTES: 5 % (ref 0–0)
NEUTROPHILS # BLD AUTO: 0.6 10*3/UL (ref 1.8–7.7)
NEUTROPHILS NFR BLD: 16 % (ref 37–80)
NUCLEATED RED BLOOD CELLS: 5.8 %
PLATELETS: 33 10*3/UL (ref 142–424)
POTASSIUM SERPL-SCNC: 5 MMOL/L (ref 3.6–5.2)
PROT SERPL-MCNC: 8 G/DL (ref 6.4–8.2)
RBC # BLD AUTO: 2.53 10*6/UL (ref 4.2–5.4)
SMALL PLATELETS BLD QL SMEAR: ABNORMAL
SODIUM BLD-SCNC: 131 MMOL/L (ref 135–145)
WBC # BLD: 3.1 10*3/UL (ref 4.6–10.2)

## 2022-10-14 PROCEDURE — 99215 OFFICE O/P EST HI 40 MIN: CPT | Mod: S$GLB,,, | Performed by: INTERNAL MEDICINE

## 2022-10-14 PROCEDURE — 99215 PR OFFICE/OUTPT VISIT, EST, LEVL V, 40-54 MIN: ICD-10-PCS | Mod: S$GLB,,, | Performed by: INTERNAL MEDICINE

## 2022-10-14 NOTE — PROGRESS NOTES
HEMATOLOGY FOLLOW UP CONSULTATION NOTE    Patient ID: Ai Pena is a 89 y.o. female.    Chief Complaint:  AML    HPI:  Patient is a 89-year-old female with past medical history of hypertension, CAD, COPD, acute on chronic kidney disease who was referred by Nephrology for evaluation of pancytopenia noted recently on her labs.  The patient reports she has not had any colonoscopy in the past and is not interested in pursuing that at this time.  Due to her complaints of fatigue she was evaluated by Nephrology and underwent lab work which showed normal iron saturation B12 and folate.  She was also offered to be started on Procrit at the recent visit.  Presents to our clinic today for further evaluation      PERIPHERAL BLOOD, FLOW CYTOMETRY: 9/2/22     - 11% CD34+/+ CELLS IDENTIFIED, COMPATIBLE WITH MYELOBLASTS, SEE   COMMENT.   Comment: Flow cytometric analysis identifies an immunophenotypically   distinct population,  compatible with myeloblasts (see complete   immunophenotyping below).  These cells may represent either a reactive   process in response to endogenous or exogenous cytokines, or a neoplastic   process, such as evolution of either a myeloproliferative or myelodysplastic   process.    09/30/2022    PERIPHERAL SMEAR REVIEW, BONE MARROW ASPIRATION, BONE MARROW BIOPSY AND FLOW   CYTOMETRIC EVALUATION:      1.  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      2.  RESULTING PANCYTOPENIA.      3.  MACROCYTIC ANEMIA.      4.  PERIPHERAL ABSOLUTE NEUTROPENIA.      5.  THROMBOCYTOPENIA.      6.  SOME SCHISTOCYTES ARE PRESENT.      7.  IRON 4+ OF 6.      8.  MOLECULAR STUDIES ARE UNDERWAY AND WILL BE REPORTED AT A LATER TIME.      9.  SEE BELOW.   Comment:  The patient presents with a history of pancytopenia and the   patient has had a previous flow cytometry showing 11% myeloblasts   approximately one month ago, see [id number].   Currently the patient has 22%   myeloblasts in the bone marrow, qualifying  this as acute leukemia.   This   information is relayed to Dr. Olivas on 09/29/2022 who requested some   molecular studies be performed on this specimen.  That testing is underway   and will be reported at a later time.  Therefore this bone marrow has   progressed to acute myeloid leukemia with 22% myeloblasts present on flow   cytometry.  This could be occurring in a background of myelodysplasia   because of the significant atypia seen in the megakaryocytes.     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 Per professional practice guidelines, testing for IDH   mutations has diagnostic and prognostic implications in the workup of   gliomas and guides therapy selection in AML. AML patients with IDH mutations    may respond to venetoclax-based therapy or IDH inhibitors.                               Past Medical History:   Diagnosis Date    Anemia, unspecified     COPD (chronic obstructive pulmonary disease)     Disorder of kidney and ureter     Hypertension     Irregular heart beat     Thyroid disease        Family History   Problem Relation Age of Onset    Heart disease Mother        Social History     Socioeconomic History    Marital status: Unknown   Tobacco Use    Smoking status: Never    Smokeless tobacco: Never   Substance and Sexual Activity    Alcohol use: Never    Drug use: Never         No past surgical history on file.        Review of systems:  Review of Systems   Constitutional:  Positive for activity change and fatigue. Negative for  appetite change, chills, diaphoresis and unexpected weight change.   HENT:  Negative for congestion, facial swelling, hearing loss, mouth sores, trouble swallowing and voice change.    Eyes:  Negative for photophobia, pain, discharge and itching.   Respiratory:  Negative for apnea, cough, choking, chest tightness and shortness of breath.    Cardiovascular:  Negative for chest pain, palpitations and leg swelling.   Gastrointestinal:  Negative for abdominal distention, abdominal pain, anal bleeding and blood in stool.   Endocrine: Negative for cold intolerance, heat intolerance, polydipsia and polyphagia.   Genitourinary:  Negative for difficulty urinating, dysuria, flank pain and hematuria.   Musculoskeletal:  Negative for arthralgias, back pain, joint swelling, myalgias, neck pain and neck stiffness.   Skin:  Negative for color change, pallor and wound.   Allergic/Immunologic: Negative for environmental allergies, food allergies and immunocompromised state.   Neurological:  Negative for dizziness, seizures, facial asymmetry, speech difficulty, light-headedness, numbness and headaches.   Hematological:  Negative for adenopathy. Does not bruise/bleed easily.   Psychiatric/Behavioral:  Negative for agitation, behavioral problems, confusion, decreased concentration and sleep disturbance.                                      Physical Exam  Vitals and nursing note reviewed.   Constitutional:       General: She is not in acute distress.     Appearance: Normal appearance. She is not ill-appearing.   HENT:      Head: Normocephalic and atraumatic.      Nose: No congestion or rhinorrhea.   Eyes:      General: No scleral icterus.     Extraocular Movements: Extraocular movements intact.      Pupils: Pupils are equal, round, and reactive to light.   Cardiovascular:      Rate and Rhythm: Normal rate and regular rhythm.      Pulses: Normal pulses.      Heart sounds: Normal heart sounds. No murmur heard.    No gallop.   Pulmonary:       Effort: Pulmonary effort is normal. No respiratory distress.      Breath sounds: Normal breath sounds. No stridor. No wheezing or rhonchi.   Abdominal:      General: Bowel sounds are normal. There is no distension.      Palpations: There is no mass.      Tenderness: There is no abdominal tenderness. There is no guarding.   Musculoskeletal:         General: No swelling, tenderness, deformity or signs of injury. Normal range of motion.      Cervical back: Normal range of motion and neck supple. No rigidity. No muscular tenderness.      Right lower leg: No edema.      Left lower leg: No edema.   Skin:     General: Skin is warm.      Coloration: Skin is not jaundiced or pale.      Findings: No bruising or lesion.      Comments: Positive for Raynaud's phenomena   Neurological:      General: No focal deficit present.      Mental Status: She is alert and oriented to person, place, and time.      Cranial Nerves: No cranial nerve deficit.      Sensory: No sensory deficit.      Motor: Weakness present.      Gait: Gait normal.      Comments: +ve for Generalized weakness   Psychiatric:         Mood and Affect: Mood normal.         Behavior: Behavior normal.         Thought Content: Thought content normal.     Vitals:    10/14/22 1111   BP: 121/71   Pulse: 79   Resp: 14   Temp: 98.9 °F (37.2 °C)     There is no height or weight on file to calculate BSA.    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 Results from extended NGS on flow cytometry revealed no alterations in FLT3, IDH1, IDH2 and NPM1. Relevant positive mutations noted were: TP53, SRSF2, ATRX.  == 10/4/22: Bone marrow biopsy revealed 22% myeloblasts-- further studies pending at this time and were requested. Based on Flow results and advanced  age, she has Acute Leukemia and patient and family are not interested to travel outside [place]. She reports being fully function prior to this recent pancytopenia and still is quite independent as far as her daily activities are concerned. She is not a candidate for induction chemotherapy and she fully understands this and prognosis. I discussed with her about adverse risk Acute Myeloid leukemia and prognosis alongwith treatment options. Discussed with her about Azacitadine only and she is agreeable to oral medications at this time. I also offered her referral to tertiary academic cancer centre with experience in treating Acute Leukemias, she is not agreeable to that at this time. She is agreeable to be evaluated virtually and in this regard I will make referral to Dr Bah at [facility]. She voices understanding.  Received IDH mutation analysis reports and she is noted to have IDH 2 mutation. Please see report above. The NGS panel on peripheral blood previously did not show this.  == 10/14/22: Here as a walk in as daughter felt she is more tired than usual and unable to sleep much at night. On evaluation seemed tired. Decreased PO intake and I will recommend IV fluids at this time. She understands that if she feels sick over the weekend she would come to ER.  She is awaiting to start Enasidenib but has not received her medication yet and awaiting insurance authorization/ mailing. Daughter understands that if her condition were to worsen she would let us know and keep us updated. Normal vital signs and oxygen saturation. Also hyponatremia on labs with slightly worsening kidney function since prior, likely due to decreased PO intake.          Plan:   Send to ER for further evaluation and to receive IV fluids: decreased PO intake plus dehydration on labs.    Total time spent in counseling and discussion about further management options including relevant lab work, treatment,  prognosis, medications  and intended side effects was more than 60 minutes. More than 50 % of the time was spent in counseling and coordination of care.  I spent a total of 60 minutes on the day of the visit.This includes face to face time and non-face to face time preparing to see the patient (eg, review of tests), Obtaining and/or reviewing separately obtained history, Documenting clinical information in the electronic or other health record, Independently interpreting resultsand communicating results to the patient/family/caregiver, or Care coordination.

## 2022-10-14 NOTE — TELEPHONE ENCOUNTER
Appt has been made for labs for 10/14/2022 and office visit 10/17/2022  Patient has been notified and understands.

## 2022-10-17 ENCOUNTER — TELEPHONE (OUTPATIENT)
Dept: HEMATOLOGY/ONCOLOGY | Facility: CLINIC | Age: 87
End: 2022-10-17

## 2022-10-17 NOTE — TELEPHONE ENCOUNTER
Patients daughter was contacted about the change of appt. Per. Dr. Olivas   A voicemail was left on the answering machine.

## 2022-10-17 NOTE — TELEPHONE ENCOUNTER
I reached out to the patient to let her know there has been a change in her appt. Date, no answer, and voicemail was left.

## 2022-10-19 ENCOUNTER — OUTSIDE PLACE OF SERVICE (OUTPATIENT)
Dept: PULMONOLOGY | Facility: CLINIC | Age: 87
End: 2022-10-19
Payer: MEDICARE

## 2022-10-19 ENCOUNTER — TELEPHONE (OUTPATIENT)
Dept: HEMATOLOGY/ONCOLOGY | Facility: CLINIC | Age: 87
End: 2022-10-19

## 2022-10-19 DIAGNOSIS — C92.00 ACUTE MYELOID LEUKEMIA NOT HAVING ACHIEVED REMISSION: Primary | ICD-10-CM

## 2022-10-19 PROCEDURE — 99223 1ST HOSP IP/OBS HIGH 75: CPT | Mod: ,,, | Performed by: INTERNAL MEDICINE

## 2022-10-19 PROCEDURE — 99223 PR INITIAL HOSPITAL CARE,LEVL III: ICD-10-PCS | Mod: ,,, | Performed by: INTERNAL MEDICINE

## 2022-10-19 NOTE — TELEPHONE ENCOUNTER
Secured [employer] ben for Idhifa and patient will pay $0 with ben on file. Ben added to [facility]. Forwarding to assigned [facility] for initial consult.     FOR DOCUMENTATION ONLY:  Financial Assistance for Idhifa approved from 10/19/22 to 9/18/23  Source: [employer]  BIN: [id number]  PCN: [id number]  Id: [id number]  GRP: [id number]  Ben Amount: $[id number]

## 2022-10-19 NOTE — TELEPHONE ENCOUNTER
Spoke to the patients daughter [place] [facility] Message sent to provider. MARIELLA

## 2022-10-19 NOTE — TELEPHONE ENCOUNTER
BENEFIT INVESTIGATION:  [employer] Medicare plan.   [facility] in network  This fill will push patient into Catastrophic stage of coverage.   Estimated co pay: $3529.72  Co pay assistance required. Forwarded to FA team for review.

## 2022-10-19 NOTE — TELEPHONE ENCOUNTER
Outgoing call to patient regarding Idhifa prescription we received. Informed patients daughter that Idhifa has been approved through insurance with a $3529.72 copay and discussed FA options. Ben funding available currently. Patients daughter provided HH size and annual income, and gave permission for [facility] to apply for ben on patients behalf. [facility] will be back in touch once FA is complete. Daughter voiced understanding.

## 2022-10-20 ENCOUNTER — SPECIALTY PHARMACY (OUTPATIENT)
Dept: PHARMACY | Facility: CLINIC | Age: 87
End: 2022-10-20
Payer: MEDICARE

## 2022-10-20 ENCOUNTER — PATIENT MESSAGE (OUTPATIENT)
Dept: PHARMACY | Facility: CLINIC | Age: 87
End: 2022-10-20
Payer: MEDICARE

## 2022-10-20 ENCOUNTER — OUTSIDE PLACE OF SERVICE (OUTPATIENT)
Dept: PULMONOLOGY | Facility: CLINIC | Age: 87
End: 2022-10-20
Payer: MEDICARE

## 2022-10-20 DIAGNOSIS — C92.00 ACUTE MYELOID LEUKEMIA NOT HAVING ACHIEVED REMISSION: Primary | ICD-10-CM

## 2022-10-20 PROCEDURE — 99231 PR SUBSEQUENT HOSPITAL CARE,LEVL I: ICD-10-PCS | Mod: ,,, | Performed by: INTERNAL MEDICINE

## 2022-10-20 PROCEDURE — 99231 SBSQ HOSP IP/OBS SF/LOW 25: CPT | Mod: ,,, | Performed by: INTERNAL MEDICINE

## 2022-10-20 RX ORDER — ACETAMINOPHEN 325 MG/1
325 TABLET ORAL EVERY 6 HOURS PRN
COMMUNITY

## 2022-10-20 NOTE — TELEPHONE ENCOUNTER
Specialty Pharmacy - Initial Clinical Assessment    Specialty Medication Orders Linked to Encounter      Flowsheet Row Most Recent Value   Medication #1 enasidenib (IDHIFA) 100 mg Tab (Order#[id number], Rx#[id number])          Patient Diagnosis   C92.00 - Acute myeloid leukemia    Subjective    Ai Pena is a 89 y.o. female, who is followed by the specialty pharmacy service for management and education.    Recent Encounters       Date Type Provider Description    10/20/2022 Specialty Pharmacy Silva Spencer PharmD Initial Clinical Assessment    10/12/2022 Specialty Pharmacy HEENA SANDOVAL PharmD Referral Authorization    10/04/2022 Specialty Pharmacy HEENA SANDOVAL PharmD Referral Authorization          Clinical call attempts since last clinical assessment   10/20/2022  2:50 PM - Specialty Pharmacy - Clinical Assessment by Silva Spencer PharmD     Current Outpatient Medications   Medication Sig    acetaminophen (TYLENOL) 325 MG tablet Take 325 mg by mouth every 6 (six) hours as needed.    acyclovir (ZOVIRAX) 400 MG tablet Take 1 tablet (400 mg total) by mouth 2 (two) times daily.    albuterol (PROVENTIL/VENTOLIN HFA) 90 mcg/actuation inhaler     amoxicillin-clavulanate 875-125mg (AUGMENTIN) 875-125 mg per tablet     enasidenib (IDHIFA) 100 mg Tab Take 100 mg by mouth once daily.    fluconazole (DIFLUCAN) 200 MG Tab Take 2 tablets (400 mg total) by mouth once daily.    ipratropium (ATROVENT) 42 mcg (0.06 %) nasal spray     isosorbide mononitrate (IMDUR) 30 MG 24 hr tablet     levoFLOXacin (LEVAQUIN) 500 MG tablet Take 1 tablet (500 mg total) by mouth once daily.    levothyroxine (SYNTHROID) 75 MCG tablet     metoprolol succinate (TOPROL-XL) 50 MG 24 hr tablet     ondansetron (ZOFRAN-ODT) 8 MG TbDL Take 1 tablet (8 mg total) by mouth 3 (three) times daily as needed (nausea).    pravastatin (PRAVACHOL) 40 MG tablet    Last reviewed on 10/20/2022  4:26 PM by Trisha Jenkins PharmD    Review of patient's  allergies indicates:   Allergen Reactions    Benadryl [diphenhydramine hcl]      Worsens her restless leg syndrome    Codeine     Sulfa (sulfonamide antibiotics)    Last reviewed on  10/20/2022 4:25 PM by Trisha Jenkins    Drug Interactions    Drug interactions evaluated: yes  Clinically relevant drug interactions identified: no  Provided the patient with educational material regarding drug interactions: not applicable         Adverse Effects    Diarrhea: Pos  Cough: Pos  Shortness of breath: Pos  Constitution: System reviewed and is negative  Skin: System reviewed and is negative  Eyes: System reviewed and is negative  Endocrine and Metabolic: System reviewed and is negative  Genitourinary: System reviewed and is negative  Cardiovascular: System reviewed and is negative  Hematologic: System reviewed and is negative  Musculoskeletal: System reviewed and is negative  HENT: System reviewed and is negative  Neurological: System reviewed and is negative  Psychiatric: System reviewed and is negative       Assessment Questions - Documented Responses      Flowsheet Row Most Recent Value   Assessment    Medication Reconciliation completed for patient Yes   During the past 4 weeks, has patient missed any activities due to condition or medication? No   During the past 4 weeks, did patient have any of the following urgent care visits? Hospital Admission   Goals of Therapy Status Discussed (new start)   Status of the patients ability to self-administer: Is Able   All education points have been covered with patient? Yes, supplemental printed education provided   Welcome packet contents reviewed and discussed with patient? Yes   Assesment completed? Yes   Plan Therapy being initiated   Do you need to open a clinical intervention (i-vent)? No   Do you want to schedule first shipment? Yes   Medication #1 Assessment Info    Patient status New medication, New to [facility]   Is this medication appropriate for the patient? Yes   Is this  "medication effective? Not yet started          Refill Questions - Documented Responses      Flowsheet Row Most Recent Value   Patient Availability and HIPAA Verification    Does patient want to proceed with activity? Yes   HIPAA/medical authority confirmed? Yes   Relationship to patient of person spoken to? Child   Refill Screening Questions    When does the patient need to receive the medication? 10/25/22   Refill Delivery Questions    How will the patient receive the medication? Mail   When does the patient need to receive the medication? 10/25/22   Shipping Address Home   Address in [facility] confirmed and updated if neccessary? Yes   Expected Copay ($) 0   Is the patient able to afford the medication copay? Yes   Payment Method zero copay   Days supply of Refill 30   Supplies needed? No supplies needed   Refill activity completed? Yes   Refill activity plan Refill scheduled   Shipment/Pickup Date: 10/24/22            Objective    She has a past medical history of Anemia, unspecified, COPD (chronic obstructive pulmonary disease), Disorder of kidney and ureter, Hypertension, Irregular heart beat, and Thyroid disease.    Tried/failed medications: N/A    BP Readings from Last 4 Encounters:   10/14/22 121/71   10/03/22 (!) 151/72   09/21/22 (!) 164/59   09/14/22 (!) 167/56     Ht Readings from Last 4 Encounters:   10/03/22 5' 2" (1.575 m)   09/21/22 5' 2" (1.575 m)   09/14/22 5' 2" (1.575 m)   08/31/22 5' 2" (1.575 m)     Wt Readings from Last 4 Encounters:   10/03/22 58.1 kg (128 lb)   09/21/22 54.9 kg (121 lb)   08/31/22 54 kg (119 lb)     Recent Labs   Lab Result Units 10/14/22  1100 10/06/22  0753 10/03/22  1120 09/26/22  0800   Hemoglobin g/dL 8.0 L 8.1 L 8.3 L 7.0 LL   Hematocrit % 24.4 L 25.5 L 25.3 L 22.6 L   WBC 10*3/uL 3.1 L 2.2 LL 2.2 LL 2.3 LL   Sodium mmol/L 131 L 137  --   --    Potassium mmol/L 5.0 4.4  --   --    Chloride mmol/L 98 L 102  --   --    Glucose mg/dL 171 H 129 H  --   --    BUN " mg/dL 36 H 27 H  --   --    Creatinine mg/dL 2.05 H 2.07 H  --   --    Calcium mg/dL 9.1 8.9  --   --    Total Protein g/dL 8.0 7.3  --   --    Albumin g/dL 3.6 3.7  --   --    Total Bilirubin mg/dL 1.3 H 1.2 H  --   --    Alkaline Phosphatase U/L 56 46  --   --    AST U/L 20 17  --   --    ALT U/L 15 9 L  --   --      The goals of cancer treatment include:  Achieving remission of cancer, if possible  Reducing tumor size and spread of cancer, if remission is not possible  Minimizing pain and symptoms of the cancer  Preventing infection and other complications of treatment  Promoting adequate nutrition  Encouraging proper hydration  Improving or maintaining quality of life  Maintaining optimal therapy adherence  Minimizing and managing side effects    Goals of Therapy Status: Discussed (new start)    Assessment/Plan  Patient plans to start therapy on 10/25/22    Interventions added this encounter   Open: [facility] Care Plan: enasidenib (IDHIFA) 100 mg Tab     Indication, dosage, appropriateness, effectiveness, safety and convenience of her specialty medication(s) were reviewed today.     Patient Education   Patient received education on the following:   Expectations and possible outcomes of therapy  Proper use, timely administration, and missed dose management  Duration of therapy  Side effects, including prevention, minimization, and management  Contraindications and safety precautions  New or changed medications, including prescribe and over the counter medications and supplements  Reviews recommended vaccinations, as appropriate  Storage, safe handling, and disposal    Patient is currently admitted due to cough, chest pain, and SOB. Per daughter, no infection or PE and planning to discharge tomorrow or this weekend.    Tasks added this encounter   No tasks added.   Tasks due within next 3 months   10/19/2022 - Clinical - Initial Assessment     Trisha Jenkins, PharmD  [facility]  [address] [place] [address]  [address]  [facility] [address]  Phone: [phone]  Fax: [phone]

## 2022-10-21 ENCOUNTER — OUTSIDE PLACE OF SERVICE (OUTPATIENT)
Dept: PULMONOLOGY | Facility: CLINIC | Age: 87
End: 2022-10-21
Payer: MEDICARE

## 2022-10-21 PROCEDURE — 99233 PR SUBSEQUENT HOSPITAL CARE,LEVL III: ICD-10-PCS | Mod: ,,, | Performed by: INTERNAL MEDICINE

## 2022-10-21 PROCEDURE — 99233 SBSQ HOSP IP/OBS HIGH 50: CPT | Mod: ,,, | Performed by: INTERNAL MEDICINE

## 2022-10-24 ENCOUNTER — PATIENT MESSAGE (OUTPATIENT)
Dept: HEMATOLOGY/ONCOLOGY | Facility: CLINIC | Age: 87
End: 2022-10-24
Payer: MEDICARE

## 2022-10-25 DIAGNOSIS — C92.00 ACUTE MYELOID LEUKEMIA NOT HAVING ACHIEVED REMISSION: Primary | ICD-10-CM

## 2022-10-27 ENCOUNTER — OFFICE VISIT (OUTPATIENT)
Dept: HEMATOLOGY/ONCOLOGY | Facility: CLINIC | Age: 87
End: 2022-10-27
Payer: MEDICARE

## 2022-10-27 VITALS
DIASTOLIC BLOOD PRESSURE: 64 MMHG | HEART RATE: 72 BPM | SYSTOLIC BLOOD PRESSURE: 124 MMHG | TEMPERATURE: 98 F | OXYGEN SATURATION: 97 % | RESPIRATION RATE: 15 BRPM | BODY MASS INDEX: 23.55 KG/M2 | WEIGHT: 128 LBS | HEIGHT: 62 IN

## 2022-10-27 DIAGNOSIS — C92.00 ACUTE MYELOID LEUKEMIA NOT HAVING ACHIEVED REMISSION: Primary | ICD-10-CM

## 2022-10-27 LAB
ALBUMIN SERPL BCP-MCNC: 2.5 G/DL (ref 3.4–5)
ALBUMIN/GLOBULIN RATIO: 0.59 RATIO (ref 1.1–1.8)
ALP SERPL-CCNC: 68 U/L (ref 46–116)
ALT SERPL W P-5'-P-CCNC: 22 U/L (ref 12–78)
ANION GAP SERPL CALC-SCNC: 9 MMOL/L (ref 3–11)
AST SERPL-CCNC: 36 U/L (ref 15–37)
BANDS: 9 % (ref 0–5)
BILIRUB SERPL-MCNC: 1.6 MG/DL (ref 0–1)
BUN SERPL-MCNC: 49 MG/DL (ref 7–18)
BUN/CREAT SERPL: 28.99 RATIO (ref 7–18)
CALCIUM SERPL-MCNC: 8.3 MG/DL (ref 8.8–10.5)
CELLS COUNTED: 100
CHLORIDE SERPL-SCNC: 100 MMOL/L (ref 100–108)
CO2 SERPL-SCNC: 22 MMOL/L (ref 21–32)
CREAT SERPL-MCNC: 1.69 MG/DL (ref 0.55–1.02)
ERYTHROCYTE [DISTWIDTH] IN BLOOD BY AUTOMATED COUNT: 17.4 % (ref 12.5–18)
GFR ESTIMATION: 28
GLOBULIN: 4.2 G/DL (ref 2.3–3.5)
GLUCOSE SERPL-MCNC: 142 MG/DL (ref 70–110)
HCT VFR BLD AUTO: 29.5 % (ref 37–47)
HGB BLD-MCNC: 9.5 G/DL (ref 12–16)
LYMPHOCYTES NFR BLD: 53 % (ref 25–40)
MCH RBC QN AUTO: 30.1 PG (ref 27–31.2)
MCHC RBC AUTO-ENTMCNC: 32.2 G/DL (ref 31.8–35.4)
MCV RBC AUTO: 93.4 FL (ref 80–97)
METAMYELOCYTES # BLD MANUAL: 3 % (ref 0–0)
MONOCYTES NFR BLD: 13 % (ref 1–15)
MYELOCYTES: 13 % (ref 0–0)
NEUTROPHILS # BLD AUTO: 1.9 10*3/UL (ref 1.8–7.7)
NEUTROPHILS NFR BLD: 9 % (ref 37–80)
NUCLEATED RED BLOOD CELLS: 7.6 %
PLATELETS: 27 10*3/UL (ref 142–424)
POIKILOCYTOSIS: ABNORMAL
POLYCHROMASIA BLD QL SMEAR: ABNORMAL
POTASSIUM SERPL-SCNC: 5 MMOL/L (ref 3.6–5.2)
PROT SERPL-MCNC: 6.7 G/DL (ref 6.4–8.2)
RBC # BLD AUTO: 3.16 10*6/UL (ref 4.2–5.4)
SMALL PLATELETS BLD QL SMEAR: ABNORMAL
SODIUM BLD-SCNC: 131 MMOL/L (ref 135–145)
WBC # BLD: 10.3 10*3/UL (ref 4.6–10.2)

## 2022-10-27 PROCEDURE — 99215 OFFICE O/P EST HI 40 MIN: CPT | Mod: S$GLB,,, | Performed by: INTERNAL MEDICINE

## 2022-10-27 PROCEDURE — 99215 PR OFFICE/OUTPT VISIT, EST, LEVL V, 40-54 MIN: ICD-10-PCS | Mod: S$GLB,,, | Performed by: INTERNAL MEDICINE

## 2022-10-27 RX ORDER — TRAMADOL HYDROCHLORIDE 50 MG/1
50 TABLET ORAL EVERY 6 HOURS PRN
COMMUNITY
Start: 2022-10-22

## 2022-10-27 NOTE — PROGRESS NOTES
HEMATOLOGY FOLLOW UP CONSULTATION NOTE    Patient ID: Ai Pena is a 89 y.o. female.    Chief Complaint:  AML    HPI:  Patient is a 89-year-old female with past medical history of hypertension, CAD, COPD, acute on chronic kidney disease who was referred by Nephrology for evaluation of pancytopenia noted recently on her labs.  The patient reports she has not had any colonoscopy in the past and is not interested in pursuing that at this time.  Due to her complaints of fatigue she was evaluated by Nephrology and underwent lab work which showed normal iron saturation B12 and folate.  She was also offered to be started on Procrit at the recent visit.  Presents to our clinic today for further evaluation      PERIPHERAL BLOOD, FLOW CYTOMETRY: 9/2/22     - 11% CD34+/+ CELLS IDENTIFIED, COMPATIBLE WITH MYELOBLASTS, SEE   COMMENT.   Comment: Flow cytometric analysis identifies an immunophenotypically   distinct population,  compatible with myeloblasts (see complete   immunophenotyping below).  These cells may represent either a reactive   process in response to endogenous or exogenous cytokines, or a neoplastic   process, such as evolution of either a myeloproliferative or myelodysplastic   process.    09/30/2022    PERIPHERAL SMEAR REVIEW, BONE MARROW ASPIRATION, BONE MARROW BIOPSY AND FLOW   CYTOMETRIC EVALUATION:      1.  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      2.  RESULTING PANCYTOPENIA.      3.  MACROCYTIC ANEMIA.      4.  PERIPHERAL ABSOLUTE NEUTROPENIA.      5.  THROMBOCYTOPENIA.      6.  SOME SCHISTOCYTES ARE PRESENT.      7.  IRON 4+ OF 6.      8.  MOLECULAR STUDIES ARE UNDERWAY AND WILL BE REPORTED AT A LATER TIME.      9.  SEE BELOW.   Comment:  The patient presents with a history of pancytopenia and the   patient has had a previous flow cytometry showing 11% myeloblasts   approximately one month ago, see [id number].   Currently the patient has 22%   myeloblasts in the bone marrow, qualifying  this as acute leukemia.   This   information is relayed to Dr. Olivas on 09/29/2022 who requested some   molecular studies be performed on this specimen.  That testing is underway   and will be reported at a later time.  Therefore this bone marrow has   progressed to acute myeloid leukemia with 22% myeloblasts present on flow   cytometry.  This could be occurring in a background of myelodysplasia   because of the significant atypia seen in the megakaryocytes.     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 Per professional practice guidelines, testing for IDH   mutations has diagnostic and prognostic implications in the workup of   gliomas and guides therapy selection in AML. AML patients with IDH mutations    may respond to venetoclax-based therapy or IDH inhibitors.                               Past Medical History:   Diagnosis Date    Anemia, unspecified     COPD (chronic obstructive pulmonary disease)     Disorder of kidney and ureter     Hypertension     Irregular heart beat     Thyroid disease        Family History   Problem Relation Age of Onset    Heart disease Mother        Social History     Socioeconomic History    Marital status: Unknown   Tobacco Use    Smoking status: Never    Smokeless tobacco: Never   Substance and Sexual Activity    Alcohol use: Never    Drug use: Never         No past surgical history on file.        Review of systems:  Review of Systems   Constitutional:  Positive for activity change and fatigue. Negative for  appetite change, chills, diaphoresis and unexpected weight change.   HENT:  Negative for congestion, facial swelling, hearing loss, mouth sores, trouble swallowing and voice change.    Eyes:  Negative for photophobia, pain, discharge and itching.   Respiratory:  Negative for apnea, cough, choking, chest tightness and shortness of breath.    Cardiovascular:  Negative for chest pain, palpitations and leg swelling.   Gastrointestinal:  Negative for abdominal distention, abdominal pain, anal bleeding and blood in stool.   Endocrine: Negative for cold intolerance, heat intolerance, polydipsia and polyphagia.   Genitourinary:  Negative for difficulty urinating, dysuria, flank pain and hematuria.   Musculoskeletal:  Negative for arthralgias, back pain, joint swelling, myalgias, neck pain and neck stiffness.   Skin:  Negative for color change, pallor and wound.   Allergic/Immunologic: Negative for environmental allergies, food allergies and immunocompromised state.   Neurological:  Negative for dizziness, seizures, facial asymmetry, speech difficulty, light-headedness, numbness and headaches.   Hematological:  Negative for adenopathy. Does not bruise/bleed easily.   Psychiatric/Behavioral:  Negative for agitation, behavioral problems, confusion, decreased concentration and sleep disturbance.                                      Physical Exam  Vitals and nursing note reviewed.   Constitutional:       General: She is not in acute distress.     Appearance: Normal appearance. She is not ill-appearing.   HENT:      Head: Normocephalic and atraumatic.      Nose: No congestion or rhinorrhea.   Eyes:      General: No scleral icterus.     Extraocular Movements: Extraocular movements intact.      Pupils: Pupils are equal, round, and reactive to light.   Cardiovascular:      Rate and Rhythm: Normal rate and regular rhythm.      Pulses: Normal pulses.      Heart sounds: Normal heart sounds. No murmur heard.    No gallop.   Pulmonary:       Effort: Pulmonary effort is normal. No respiratory distress.      Breath sounds: Normal breath sounds. No stridor. No wheezing or rhonchi.   Abdominal:      General: Bowel sounds are normal. There is no distension.      Palpations: There is no mass.      Tenderness: There is no abdominal tenderness. There is no guarding.   Musculoskeletal:         General: No swelling, tenderness, deformity or signs of injury. Normal range of motion.      Cervical back: Normal range of motion and neck supple. No rigidity. No muscular tenderness.      Right lower leg: No edema.      Left lower leg: No edema.   Skin:     General: Skin is warm.      Coloration: Skin is not jaundiced or pale.      Findings: No bruising or lesion.      Comments: Positive for Raynaud's phenomena   Neurological:      General: No focal deficit present.      Mental Status: She is alert and oriented to person, place, and time.      Cranial Nerves: No cranial nerve deficit.      Sensory: No sensory deficit.      Motor: Weakness present.      Gait: Gait normal.      Comments: +ve for Generalized weakness   Psychiatric:         Mood and Affect: Mood normal.         Behavior: Behavior normal.         Thought Content: Thought content normal.     Vitals:    10/27/22 0902   BP: 124/64   Pulse: 72   Resp: 15   Temp: 98 °F (36.7 °C)     Body surface area is 1.59 meters squared.    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 Results from extended NGS on flow cytometry revealed no alterations in FLT3, IDH1, IDH2 and NPM1. Relevant positive mutations noted were: TP53, SRSF2, ATRX.  == 10/4/22: Bone marrow biopsy revealed 22% myeloblasts-- further studies pending at this time and were requested. Based on Flow results and advanced age, she has  Acute Leukemia and patient and family are not interested to travel outside [place]. She reports being fully function prior to this recent pancytopenia and still is quite independent as far as her daily activities are concerned. She is not a candidate for induction chemotherapy and she fully understands this and prognosis. I discussed with her about adverse risk Acute Myeloid leukemia and prognosis alongwith treatment options. Discussed with her about Azacitadine only and she is agreeable to oral medications at this time. I also offered her referral to tertiary academic cancer centre with experience in treating Acute Leukemias, she is not agreeable to that at this time. She is agreeable to be evaluated virtually and in this regard I will make referral to Dr Bah at [facility]. She voices understanding.  Received IDH mutation analysis reports and she is noted to have IDH 2 mutation. Please see report above. The NGS panel on peripheral blood previously did not show this.  == 10/14/22: Here as a walk in as daughter felt she is more tired than usual and unable to sleep much at night. On evaluation seemed tired. Decreased PO intake and I will recommend IV fluids at this time. She understands that if she feels sick over the weekend she would come to ER.  She is awaiting to start Enasidenib but has not received her medication yet and awaiting insurance authorization/ mailing. Daughter understands that if her condition were to worsen she would let us know and keep us updated. Normal vital signs and oxygen saturation. Also hyponatremia on labs with slightly worsening kidney function since prior, likely due to decreased PO intake.  == 10/27/22: Will start Enasidenib today. Will transfuse 1 U platelets for platelets less than 20 K. Prophylactic antibacterial, antifungal and antiviral medications to continue. Bi-weekly labs      Plan:   Transfuse 1 U PRBC or/and 1 U platelet for Hgb < 7 and Platelets < 20  K  Start Enasidenib  Repeat CBC with diff, CMP bi-weekly, Monday and thursday    Total time spent in counseling and discussion about further management options including relevant lab work, treatment,  prognosis, medications and intended side effects was more than 60 minutes. More than 50 % of the time was spent in counseling and coordination of care.  I spent a total of 60 minutes on the day of the visit.This includes face to face time and non-face to face time preparing to see the patient (eg, review of tests), Obtaining and/or reviewing separately obtained history, Documenting clinical information in the electronic or other health record, Independently interpreting resultsand communicating results to the patient/family/caregiver, or Care coordination.

## 2022-10-28 ENCOUNTER — TELEPHONE (OUTPATIENT)
Dept: HEMATOLOGY/ONCOLOGY | Facility: CLINIC | Age: 87
End: 2022-10-28
Payer: MEDICARE

## 2022-10-28 DIAGNOSIS — F41.9 ANXIETY: Primary | ICD-10-CM

## 2022-10-28 DIAGNOSIS — C92.00 ACUTE MYELOID LEUKEMIA NOT HAVING ACHIEVED REMISSION: Primary | ICD-10-CM

## 2022-10-28 NOTE — TELEPHONE ENCOUNTER
Spoke to [facility] for patient's weekly maintenance:      -   They will draw CMP and CBC every Monday and Thursday.    -   Will bring labs to be resulted at [facility] so that we can view results in [facility]    -  Will direct critical results directly to [facility]'s phone line  (direct line number given, cell number given, office number given)    -  Will plan to transfuse PRN on Tuesdays and Fridays, patient to be directed straight to infusion bay to be banded and transfused

## 2022-10-28 NOTE — TELEPHONE ENCOUNTER
"Spoke to the patients daughter she states the patient has had a significant increase in her anxiety level. She states the patient is having trouble sleeping and "can't get her mind to stop". She requested something for anxiety be sent out "the lightest thing Possible". Message sent to provider. TTRN  "

## 2022-10-31 ENCOUNTER — PATIENT MESSAGE (OUTPATIENT)
Dept: HEMATOLOGY/ONCOLOGY | Facility: CLINIC | Age: 87
End: 2022-10-31
Payer: MEDICARE

## 2022-11-01 ENCOUNTER — PATIENT MESSAGE (OUTPATIENT)
Dept: HEMATOLOGY/ONCOLOGY | Facility: CLINIC | Age: 87
End: 2022-11-01
Payer: MEDICARE

## 2022-11-01 DIAGNOSIS — C92.00 ACUTE MYELOID LEUKEMIA NOT HAVING ACHIEVED REMISSION: Primary | ICD-10-CM

## 2022-11-01 LAB
ANISOCYTOSIS: ABNORMAL
BANDS: 3 % (ref 0–5)
CELLS COUNTED: 100
ERYTHROCYTE [DISTWIDTH] IN BLOOD BY AUTOMATED COUNT: 17.3 % (ref 12.5–18)
HCT VFR BLD AUTO: 25 % (ref 37–47)
HGB BLD-MCNC: 8 G/DL (ref 12–16)
LYMPHOCYTES NFR BLD: 26 % (ref 25–40)
MCH RBC QN AUTO: 30.4 PG (ref 27–31.2)
MCHC RBC AUTO-ENTMCNC: 32 G/DL (ref 31.8–35.4)
MCV RBC AUTO: 95.1 FL (ref 80–97)
METAMYELOCYTES # BLD MANUAL: 20 % (ref 0–0)
MONOCYTES NFR BLD: 21 % (ref 1–15)
MYELOCYTES: 12 % (ref 0–0)
NEUTROPHILS # BLD AUTO: 1.4 10*3/UL (ref 1.8–7.7)
NEUTROPHILS NFR BLD: 18 % (ref 37–80)
NUCLEATED RED BLOOD CELLS: 8.2 %
PLATELETS: 12 10*3/UL (ref 142–424)
RBC # BLD AUTO: 2.63 10*6/UL (ref 4.2–5.4)
SMALL PLATELETS BLD QL SMEAR: ABNORMAL
WBC # BLD: 6.7 10*3/UL (ref 4.6–10.2)

## 2022-11-03 ENCOUNTER — OUTSIDE PLACE OF SERVICE (OUTPATIENT)
Dept: HEMATOLOGY/ONCOLOGY | Facility: CLINIC | Age: 87
End: 2022-11-03
Payer: MEDICARE

## 2022-11-03 PROCEDURE — 99221 PR INITIAL HOSPITAL CARE,LEVL I: ICD-10-PCS | Mod: ,,, | Performed by: NURSE PRACTITIONER

## 2022-11-03 PROCEDURE — 99221 1ST HOSP IP/OBS SF/LOW 40: CPT | Mod: ,,, | Performed by: NURSE PRACTITIONER

## 2022-11-04 ENCOUNTER — OUTSIDE PLACE OF SERVICE (OUTPATIENT)
Dept: HEMATOLOGY/ONCOLOGY | Facility: CLINIC | Age: 87
End: 2022-11-04
Payer: MEDICARE

## 2022-11-04 PROCEDURE — 99232 PR SUBSEQUENT HOSPITAL CARE,LEVL II: ICD-10-PCS | Mod: ,,, | Performed by: NURSE PRACTITIONER

## 2022-11-04 PROCEDURE — 99232 SBSQ HOSP IP/OBS MODERATE 35: CPT | Mod: ,,, | Performed by: NURSE PRACTITIONER

## 2022-11-09 ENCOUNTER — PATIENT MESSAGE (OUTPATIENT)
Dept: HEMATOLOGY/ONCOLOGY | Facility: CLINIC | Age: 87
End: 2022-11-09
Payer: MEDICARE

## 2022-11-15 LAB — SPECIMEN TO PATHOLOGY: NORMAL
